# Patient Record
Sex: FEMALE | Race: WHITE | Employment: STUDENT | URBAN - METROPOLITAN AREA
[De-identification: names, ages, dates, MRNs, and addresses within clinical notes are randomized per-mention and may not be internally consistent; named-entity substitution may affect disease eponyms.]

---

## 2021-07-15 ENCOUNTER — HOSPITAL ENCOUNTER (EMERGENCY)
Age: 21
Discharge: HOME | End: 2021-07-15
Attending: EMERGENCY MEDICINE
Payer: COMMERCIAL

## 2021-07-15 VITALS
BODY MASS INDEX: 24.8 KG/M2 | HEIGHT: 63 IN | HEART RATE: 87 BPM | DIASTOLIC BLOOD PRESSURE: 56 MMHG | OXYGEN SATURATION: 100 % | TEMPERATURE: 97.9 F | WEIGHT: 140 LBS | SYSTOLIC BLOOD PRESSURE: 93 MMHG | RESPIRATION RATE: 17 BRPM

## 2021-07-15 DIAGNOSIS — R40.4 ALTERED AWARENESS, TRANSIENT: Primary | ICD-10-CM

## 2021-07-15 PROCEDURE — 99283 EMERGENCY DEPT VISIT LOW MDM: CPT

## 2021-07-16 NOTE — ED NOTES
Pt presents to the ED with c/o dizziness; SOB. Pt states she went to the bathroom and suddenly felt clammy and short of breath. Pt denies having a BM or bearing down while on the toilet. Father reports pt hasn't eaten all day and worked out earlier. Prior to arrival, pt states she started feeling better and wanted to go home; however, her friend was concerned and told her to come to the hospital. Pt also reports people told her she appeared very pale. Pt is A/O x4; VSS at this time. Respirations are even and unlabored. Pt denies pain.       Claudia Ugarte RN  07/15/21 2021

## 2021-07-16 NOTE — ED NOTES
Discharge paperwork and follow up care reviewed with patient and all questions answered at this time. Patient stable and ambulatory at time of discharge.       Edward Moraes RN  07/15/21 2022

## 2021-07-16 NOTE — ED PROVIDER NOTES
EMERGENCY DEPARTMENT ENCOUNTER    Pt Name: Raeann Perez  MRN: [id number]  [place] 2000  Date of evaluation: 7/15/21  CHIEF COMPLAINT       Chief Complaint   Patient presents with    Shortness of Breath     Pt reports that she is feeling SOB. Pt reports that she was trying to have BM when she got clammy and devleoped SOB      HISTORY OF PRESENT ILLNESS   HPI  64-year-old female with a history of anxiety presents after an episode earlier today where she suddenly developed tunnel vision, became clammy and sweaty, had shortness of breath and tingling that is since resolved. Patient states that she was out with friends earlier this evening, she felt that she needed to go to the bathroom, while using the bathroom she suddenly felt that her vision was becoming tunneled, she became somewhat short of breath, sweaty, had tingling in her hands and feet. Patient states that the symptoms are significantly better now, states that she does not want or need to be in the ER. At this time she endorses mild feeling of dizziness but denies chest pain, shortness of breath, fever/chills, cough, nausea/vomiting, diarrhea, dysuria, abdominal pain or any other acute complaints. Per father at bedside patient has a history of anxiety attacks, patient is unsure if this was an anxiety attack or not. REVIEW OF SYSTEMS     Review of Systems   All other systems reviewed and are negative. PASTMEDICAL HISTORY   History reviewed. No pertinent past medical history. SURGICAL HISTORY     History reviewed. No pertinent surgical history. CURRENT MEDICATIONS     There are no discharge medications for this patient. ALLERGIES     has No Known Allergies. FAMILY HISTORY     has no family status information on file.       SOCIAL HISTORY       Social History     Tobacco Use    Smoking status: Never Smoker   Substance Use Topics    Alcohol use: No    Drug use: No     PHYSICAL EXAM     INITIAL VITALS: BP (!) 93/56   Pulse 87   Temp 97.9 °F (36.6 °C) (Oral)   Resp 17   Ht 5' 3\" (1.6 m)   Wt 140 lb (63.5 kg)   LMP 06/15/2021 (Approximate)   SpO2 100%   BMI 24.80 kg/m²    Physical Exam  Constitutional:       General: She is not in acute distress. Appearance: Normal appearance. She is normal weight. HENT:      Head: Normocephalic and atraumatic. Right Ear: Tympanic membrane and external ear normal.      Left Ear: Tympanic membrane and external ear normal.      Nose: Nose normal.      Mouth/Throat:      Mouth: Mucous membranes are moist.   Eyes:      Extraocular Movements: Extraocular movements intact. Conjunctiva/sclera: Conjunctivae normal.      Pupils: Pupils are equal, round, and reactive to light. Cardiovascular:      Rate and Rhythm: Normal rate and regular rhythm. Pulses: Normal pulses. Heart sounds: Normal heart sounds. No murmur heard. Pulmonary:      Effort: Pulmonary effort is normal. No respiratory distress. Breath sounds: Normal breath sounds. No wheezing or rhonchi. Abdominal:      General: Bowel sounds are normal. There is no distension. Palpations: Abdomen is soft. There is no mass. Tenderness: There is no abdominal tenderness. There is no right CVA tenderness, left CVA tenderness, guarding or rebound. Musculoskeletal:         General: No swelling or deformity. Cervical back: Normal range of motion and neck supple. No rigidity. Skin:     Capillary Refill: Capillary refill takes less than 2 seconds. Coloration: Skin is not jaundiced. Findings: No bruising, erythema or rash. Neurological:      General: No focal deficit present. Mental Status: She is alert and oriented to person, place, and time. Cranial Nerves: No cranial nerve deficit. Sensory: No sensory deficit.    Psychiatric:         Mood and Affect: Mood normal.         Behavior: Behavior normal.         MEDICAL DECISION MAKING:     - Pt is well-appearing, symptoms resolved, she is breathing comfortably, vitals are normal, patient is requesting to go home. Oxygen saturation is 100% on room air, heart rate in the 80s, low concern for PE or other acute process. I gave the patient and her father very strict return precautions, she was discharged in the ED with instructions to follow-up with her PCP. She left in stable condition. DIAGNOSTIC RESULTS   EKG:All EKG's are interpreted by the Emergency Department Physician who either signs or Co-signs this chart in the absence of a cardiologist.        RADIOLOGY:All plain film, CT, MRI, and formal ultrasound images (except ED bedside ultrasound) are read by the radiologist, see reports below, unless otherwisenoted in MDM or here. No orders to display     LABS: All lab results were reviewed by myself, and all abnormals are listed below. Labs Reviewed - No data to display    EMERGENCY DEPARTMENTCOURSE:         Vitals:    Vitals:    07/15/21 1940   BP: (!) 93/56   Pulse: 87   Resp: 17   Temp: 97.9 °F (36.6 °C)   TempSrc: Oral   SpO2: 100%   Weight: 140 lb (63.5 kg)   Height: 5' 3\" (1.6 m)       The patient was given the following medications while in the emergency department:  No orders of the defined types were placed in this encounter. CONSULTS:  None    FINAL IMPRESSION      1. Altered awareness, transient          DISPOSITION/PLAN   DISPOSITION Decision To Discharge 07/15/2021 08:00:55 PM      PATIENT REFERRED TO:  Your primary care doctor  Make an appointment to follow-up with your primary care doctor. DISCHARGE MEDICATIONS:  There are no discharge medications for this patient.     Reno Gentile MD  Attending Emergency Physician                    Reno Gentile MD  07/15/21 [id number]

## 2024-06-10 DIAGNOSIS — M25.562 LEFT KNEE PAIN, UNSPECIFIED CHRONICITY: Primary | ICD-10-CM

## 2024-06-14 ENCOUNTER — OFFICE VISIT (OUTPATIENT)
Dept: ORTHOPEDIC SURGERY | Age: 24
End: 2024-06-14
Payer: COMMERCIAL

## 2024-06-14 VITALS — BODY MASS INDEX: 28.35 KG/M2 | HEIGHT: 63 IN | WEIGHT: 160 LBS

## 2024-06-14 DIAGNOSIS — M25.561 ACUTE PAIN OF RIGHT KNEE: Primary | ICD-10-CM

## 2024-06-14 PROCEDURE — 99203 OFFICE O/P NEW LOW 30 MIN: CPT

## 2024-06-14 RX ORDER — DICLOFENAC SODIUM 75 MG/1
75 TABLET, DELAYED RELEASE ORAL 2 TIMES DAILY
Qty: 24 TABLET | Refills: 1 | Status: SHIPPED | OUTPATIENT
Start: 2024-06-14

## 2024-06-14 ASSESSMENT — ENCOUNTER SYMPTOMS
COLOR CHANGE: 0
NAUSEA: 0
VOMITING: 0

## 2024-06-14 NOTE — PROGRESS NOTES
Occupational History    Not on file   Tobacco Use    Smoking status: Never    Smokeless tobacco: Not on file   Substance and Sexual Activity    Alcohol use: No    Drug use: No    Sexual activity: Not on file   Other Topics Concern    Not on file   Social History Narrative    Not on file     Social Determinants of Health     Financial Resource Strain: Not on file   Food Insecurity: Not on file   Transportation Needs: Not on file   Physical Activity: Not on file   Stress: Not on file   Social Connections: Not on file   Intimate Partner Violence: Not on file   Housing Stability: Not on file       Family History:  History reviewed. No pertinent family history.      REVIEW OF SYSTEMS:  Review of Systems   Constitutional:  Negative for chills and fever.   Gastrointestinal:  Negative for nausea and vomiting.   Musculoskeletal:  Positive for arthralgias and gait problem. Negative for joint swelling.   Skin:  Negative for color change and rash.   Neurological:  Negative for weakness and numbness.         PHYSICAL EXAM:  Ht 1.6 m (5' 3\")   Wt 72.6 kg (160 lb)   BMI 28.34 kg/m²  Body mass index is 28.34 kg/m².  Physical Exam  Gen: alert and oriented  Psych:  Appropriate affect; Appropriate knowledge base; Appropriate mood; No hallucinations;   Head: normocephalic, atraumatic   Chest: symmetric chest excursion  Pelvis: stable with ambulation  Ortho Exam  Extremity: The patient walks with a mild antalgic gait and without the use of a walking aid.  Examination of the right knee demonstrates that the skin is intact and there is no erythema, ecchymosis, or soft tissue swelling.  There is no obvious outward deformity.  Range of motion of the knee includes 0-100.  There is pain to palpation along the medial and lateral joint lines.  The knee is stable to both varus and valgus stress with 0 and 30 degrees of flexion.  Medial and lateral Willy's is negative.  Anterior drawer testing and Lachman's is negative.  Patellar grind

## 2024-06-17 ENCOUNTER — TELEPHONE (OUTPATIENT)
Dept: ORTHOPEDIC SURGERY | Age: 24
End: 2024-06-17

## 2024-06-17 NOTE — TELEPHONE ENCOUNTER
Patient called in to see if she can have letter to return back to work without restrictions   Patient would like copy uploaded in her mychart please  Please advise  Thank you

## 2024-07-08 ENCOUNTER — HOSPITAL ENCOUNTER (OUTPATIENT)
Dept: PHYSICAL THERAPY | Age: 24
Setting detail: THERAPIES SERIES
Discharge: HOME | End: 2024-07-08
Payer: COMMERCIAL

## 2024-07-08 PROCEDURE — 97161 PT EVAL LOW COMPLEX 20 MIN: CPT

## 2024-07-08 NOTE — CONSULTS
Moderate Complexity [] High Complexity       Treatment Charges: Mins Units   [x] Evaluation       [x]  Low       []  Moderate       []  High 30 1   []  Modalities     []  Ther Exercise     []  Manual Therapy     []  Ther Activities     []  Aquatics     []  Vasocompression     []  Other       TOTAL BILLABLE TIME: 30      Time in:1530     Time out:1610    Electronically signed by: Hosea Valle PT

## 2024-07-12 ENCOUNTER — APPOINTMENT (OUTPATIENT)
Dept: PHYSICAL THERAPY | Age: 24
End: 2024-07-12
Payer: COMMERCIAL

## 2024-07-12 NOTE — PRE-CERTIFICATION NOTE
..       [] [facility]  Outpatient Rehabilitation &  Therapy  [address]  [phone]  [phone] [] [facility]  Outpatient Rehabilitation &  Therapy  [address]  P: [phone]  F: [phone] [] [facility]  Outpatient Rehabilitation &  Therapy  [address]  P: [phone]  F: [phone] [] [facility]  Outpatient Rehabilitation &  Therapy  [address]  [phone]  [phone] [] [facility]  Outpatient Rehabilitation &  Therapy  [address]   P: [phone]  F: [phone]  [] [facility]  Outpatient Rehabilitation &  Therapy  [address]  P: [phone]  F: [phone] [] [facility]  Outpatient Rehabilitation &  Therapy  [address]  P: [phone]  F: [phone] [] [facility]  Outpatient Rehabilitation &  Therapy  [address]  P: [phone]  F: [phone] [] [facility]  Outpatient Rehabilitation &  Therapy  [address]  P: [phone]  F: [phone]  [] [facility] Outpatient Rehabilitation &  Therapy  [address]  P: [phone]  F: [phone]          Therapy Pre-certification Note      7/12/2024    Rainer Peguero  2000   [id number]      Insurance approval was received for Physical Therapy from [employer] on 7/12/24.  Approval was received for 8 visits, from 7/12/24 to 9/10/24.  Authorization number [id number].    Patient was already scheduled for a follow-up visit on 7/19/24 @ 2pm.    Electronically signed by Dannielle Black on 7/12/2024 at 1:57 PM

## 2024-07-15 ENCOUNTER — APPOINTMENT (OUTPATIENT)
Dept: PHYSICAL THERAPY | Age: 24
End: 2024-07-15
Payer: COMMERCIAL